# Patient Record
Sex: MALE | Race: WHITE | Employment: UNEMPLOYED | URBAN - METROPOLITAN AREA
[De-identification: names, ages, dates, MRNs, and addresses within clinical notes are randomized per-mention and may not be internally consistent; named-entity substitution may affect disease eponyms.]

---

## 2022-07-14 ENCOUNTER — OFFICE VISIT (OUTPATIENT)
Dept: INTERNAL MEDICINE | Age: 32
End: 2022-07-14
Payer: MEDICAID

## 2022-07-14 ENCOUNTER — HOSPITAL ENCOUNTER (OUTPATIENT)
Age: 32
Setting detail: SPECIMEN
Discharge: HOME | End: 2022-07-14

## 2022-07-14 VITALS
HEART RATE: 61 BPM | SYSTOLIC BLOOD PRESSURE: 132 MMHG | OXYGEN SATURATION: 99 % | TEMPERATURE: 98.4 F | HEIGHT: 68 IN | WEIGHT: 166 LBS | DIASTOLIC BLOOD PRESSURE: 76 MMHG | BODY MASS INDEX: 25.16 KG/M2

## 2022-07-14 DIAGNOSIS — F17.210 CIGARETTE SMOKER: ICD-10-CM

## 2022-07-14 DIAGNOSIS — Z11.59 NEED FOR HEPATITIS B SCREENING TEST: ICD-10-CM

## 2022-07-14 DIAGNOSIS — I10 BENIGN ESSENTIAL HYPERTENSION: Primary | ICD-10-CM

## 2022-07-14 DIAGNOSIS — Z86.19 HX OF HEPATITIS C: ICD-10-CM

## 2022-07-14 DIAGNOSIS — F90.8 ATTENTION DEFICIT HYPERACTIVITY DISORDER (ADHD), OTHER TYPE: ICD-10-CM

## 2022-07-14 DIAGNOSIS — F43.10 PTSD (POST-TRAUMATIC STRESS DISORDER): ICD-10-CM

## 2022-07-14 DIAGNOSIS — I10 BENIGN ESSENTIAL HYPERTENSION: ICD-10-CM

## 2022-07-14 DIAGNOSIS — F19.91 HISTORY OF INTRAVENOUS DRUG USE IN REMISSION: ICD-10-CM

## 2022-07-14 LAB
ABSOLUTE EOS #: <0.03 K/UL (ref 0–0.44)
ABSOLUTE IMMATURE GRANULOCYTE: <0.03 K/UL (ref 0–0.3)
ABSOLUTE LYMPH #: 2.44 K/UL (ref 1.1–3.7)
ABSOLUTE MONO #: 0.37 K/UL (ref 0.1–1.2)
ALBUMIN SERPL-MCNC: 4.8 G/DL (ref 3.5–5.2)
ALBUMIN/GLOBULIN RATIO: 1.8 (ref 1–2.5)
ALP BLD-CCNC: 55 U/L (ref 40–129)
ALT SERPL-CCNC: 48 U/L (ref 5–41)
ANION GAP SERPL CALCULATED.3IONS-SCNC: 11 MMOL/L (ref 9–17)
AST SERPL-CCNC: 30 U/L
BASOPHILS # BLD: 1 % (ref 0–2)
BASOPHILS ABSOLUTE: 0.04 K/UL (ref 0–0.2)
BILIRUB SERPL-MCNC: 0.56 MG/DL (ref 0.3–1.2)
BUN BLDV-MCNC: 12 MG/DL (ref 6–20)
CALCIUM SERPL-MCNC: 9.6 MG/DL (ref 8.6–10.4)
CHLORIDE BLD-SCNC: 101 MMOL/L (ref 98–107)
CO2: 29 MMOL/L (ref 20–31)
CREAT SERPL-MCNC: 1.01 MG/DL (ref 0.7–1.2)
EOSINOPHILS RELATIVE PERCENT: 0 % (ref 1–4)
GFR AFRICAN AMERICAN: >60 ML/MIN
GFR NON-AFRICAN AMERICAN: >60 ML/MIN
GFR SERPL CREATININE-BSD FRML MDRD: ABNORMAL ML/MIN/{1.73_M2}
GLUCOSE BLD-MCNC: 105 MG/DL (ref 70–99)
HBV SURFACE AB TITR SER: 99.77 MIU/ML
HCT VFR BLD CALC: 41.5 % (ref 40.7–50.3)
HEMOGLOBIN: 14.8 G/DL (ref 13–17)
HEPATITIS B CORE TOTAL ANTIBODY: NONREACTIVE
HEPATITIS B SURFACE ANTIGEN: NONREACTIVE
HIV AG/AB: NONREACTIVE
IMMATURE GRANULOCYTES: 0 %
LYMPHOCYTES # BLD: 43 % (ref 24–43)
MCH RBC QN AUTO: 30.6 PG (ref 25.2–33.5)
MCHC RBC AUTO-ENTMCNC: 35.7 G/DL (ref 28.4–34.8)
MCV RBC AUTO: 85.9 FL (ref 82.6–102.9)
MONOCYTES # BLD: 7 % (ref 3–12)
NRBC AUTOMATED: 0 PER 100 WBC
PDW BLD-RTO: 12.4 % (ref 11.8–14.4)
PLATELET # BLD: 213 K/UL (ref 138–453)
PMV BLD AUTO: 11.1 FL (ref 8.1–13.5)
POTASSIUM SERPL-SCNC: 3.9 MMOL/L (ref 3.7–5.3)
RBC # BLD: 4.83 M/UL (ref 4.21–5.77)
SEG NEUTROPHILS: 49 % (ref 36–65)
SEGMENTED NEUTROPHILS ABSOLUTE COUNT: 2.81 K/UL (ref 1.5–8.1)
SODIUM BLD-SCNC: 141 MMOL/L (ref 135–144)
TOTAL PROTEIN: 7.5 G/DL (ref 6.4–8.3)
WBC # BLD: 5.7 K/UL (ref 3.5–11.3)

## 2022-07-14 PROCEDURE — 99211 OFF/OP EST MAY X REQ PHY/QHP: CPT | Performed by: INTERNAL MEDICINE

## 2022-07-14 PROCEDURE — 99203 OFFICE O/P NEW LOW 30 MIN: CPT | Performed by: STUDENT IN AN ORGANIZED HEALTH CARE EDUCATION/TRAINING PROGRAM

## 2022-07-14 RX ORDER — NICOTINE 21 MG/24HR
1 PATCH, TRANSDERMAL 24 HOURS TRANSDERMAL DAILY
Qty: 42 PATCH | Refills: 0 | Status: SHIPPED | OUTPATIENT
Start: 2022-07-14 | End: 2022-08-25

## 2022-07-14 RX ORDER — NICOTINE 21 MG/24HR
PATCH, TRANSDERMAL 24 HOURS TRANSDERMAL
COMMUNITY
Start: 2022-06-21

## 2022-07-14 RX ORDER — TRAZODONE HYDROCHLORIDE 100 MG/1
100 TABLET ORAL NIGHTLY
COMMUNITY
Start: 2022-06-21

## 2022-07-14 RX ORDER — BUPRENORPHINE HYDROCHLORIDE AND NALOXONE HYDROCHLORIDE DIHYDRATE 8; 2 MG/1; MG/1
3 TABLET SUBLINGUAL DAILY
COMMUNITY

## 2022-07-14 SDOH — ECONOMIC STABILITY: FOOD INSECURITY: WITHIN THE PAST 12 MONTHS, YOU WORRIED THAT YOUR FOOD WOULD RUN OUT BEFORE YOU GOT MONEY TO BUY MORE.: NEVER TRUE

## 2022-07-14 SDOH — ECONOMIC STABILITY: FOOD INSECURITY: WITHIN THE PAST 12 MONTHS, THE FOOD YOU BOUGHT JUST DIDN'T LAST AND YOU DIDN'T HAVE MONEY TO GET MORE.: NEVER TRUE

## 2022-07-14 SDOH — ECONOMIC STABILITY: TRANSPORTATION INSECURITY
IN THE PAST 12 MONTHS, HAS LACK OF TRANSPORTATION KEPT YOU FROM MEETINGS, WORK, OR FROM GETTING THINGS NEEDED FOR DAILY LIVING?: NO

## 2022-07-14 SDOH — ECONOMIC STABILITY: TRANSPORTATION INSECURITY
IN THE PAST 12 MONTHS, HAS THE LACK OF TRANSPORTATION KEPT YOU FROM MEDICAL APPOINTMENTS OR FROM GETTING MEDICATIONS?: NO

## 2022-07-14 ASSESSMENT — ENCOUNTER SYMPTOMS
RHINORRHEA: 0
CONSTIPATION: 0
SORE THROAT: 0
DIARRHEA: 0
SHORTNESS OF BREATH: 0
WHEEZING: 0
VOMITING: 0
COUGH: 0
ABDOMINAL PAIN: 0

## 2022-07-14 ASSESSMENT — PATIENT HEALTH QUESTIONNAIRE - PHQ9
SUM OF ALL RESPONSES TO PHQ QUESTIONS 1-9: 2
2. FEELING DOWN, DEPRESSED OR HOPELESS: 1
SUM OF ALL RESPONSES TO PHQ9 QUESTIONS 1 & 2: 2
SUM OF ALL RESPONSES TO PHQ QUESTIONS 1-9: 2
1. LITTLE INTEREST OR PLEASURE IN DOING THINGS: 1
SUM OF ALL RESPONSES TO PHQ QUESTIONS 1-9: 2
SUM OF ALL RESPONSES TO PHQ QUESTIONS 1-9: 2

## 2022-07-14 ASSESSMENT — SOCIAL DETERMINANTS OF HEALTH (SDOH): HOW HARD IS IT FOR YOU TO PAY FOR THE VERY BASICS LIKE FOOD, HOUSING, MEDICAL CARE, AND HEATING?: NOT HARD AT ALL

## 2022-07-14 NOTE — PATIENT INSTRUCTIONS
Return To Clinic in October for 3 month follow up. Patient was added to wait list. Patient will be contacted by office to schedule upcoming appointment with the physician. After Visit Summary given and reviewed. The medication list included in this document is our record of what you are currently taking, including any changes that were made at today's visit. If you find any differences when compared to your medications at home, or have any questions that were not answered at your visit, please contact the office. It is very important for your care that you keep your appointment. If for some reason you are unable to keep your appointment, it is equally important that you call our office at [phone] to cancel your appointment and reschedule. Failure to do so may result in your termination from our practice.      -SHANEKA iBrd
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
## 2022-07-14 NOTE — PROGRESS NOTES
Attending Physician Statement  I have discussed the care of Zara Mendoza including pertinent history and exam findings,  with the resident. I have reviewed the key elements of all parts of the encounter with the resident. I agree with the assessment, plan and orders as documented by the resident.   (GE Modifier)    MD PRISCA Bhakta  Attending Physician, [address] [place] [address], Internal Medicine Residency Program  [address] [place] [address]  7/14/2022, 1:57 PM

## 2022-07-17 LAB
HEPATITIS C RNA PCR QUANT: DETECTED
HEPATITIS C RNA QUANT LOG: 6.66 LOG IU/ML
HEPATITIS C RNA-PCR: ABNORMAL IU/ML
SOURCE: ABNORMAL

## 2024-12-13 ENCOUNTER — HOSPITAL ENCOUNTER (OUTPATIENT)
Age: 34
Discharge: HOME | End: 2024-12-13
Payer: COMMERCIAL

## 2024-12-13 LAB
ALANINE AMINOTRANSFERASE, FIBROMETER: NORMAL
ALBUMIN SERPL-MCNC: 4.4 G/DL (ref 3.5–5.2)
ALBUMIN/GLOB SERPL: 1.6 {RATIO} (ref 1–2.5)
ALP SERPL-CCNC: 52 U/L (ref 40–129)
ALPHA-2-MACROGLOBULIN, FIBROMETER: NORMAL
ALT SERPL-CCNC: 55 U/L (ref 10–50)
ANION GAP SERPL CALCULATED.3IONS-SCNC: 8 MMOL/L (ref 9–16)
ASPARTATE AMINOTRANSFERASE, FIBROMETER: NORMAL
AST SERPL-CCNC: 42 U/L (ref 10–50)
BASOPHILS # BLD: <0.03 K/UL (ref 0–0.2)
BASOPHILS NFR BLD: 0 % (ref 0–2)
BILIRUB SERPL-MCNC: 0.5 MG/DL (ref 0–1.2)
BUN SERPL-MCNC: 13 MG/DL (ref 6–20)
CALCIUM SERPL-MCNC: 9.2 MG/DL (ref 8.6–10.4)
CHLORIDE SERPL-SCNC: 103 MMOL/L (ref 98–107)
CHOLEST SERPL-MCNC: 90 MG/DL (ref 0–199)
CHOLESTEROL/HDL RATIO: 2.6
CIRRHOMETER PATIENT SCORE: NORMAL
CO2 SERPL-SCNC: 30 MMOL/L (ref 20–31)
CREAT SERPL-MCNC: 0.9 MG/DL (ref 0.7–1.2)
EER FIBROMETER REPORT: NORMAL
EOSINOPHIL # BLD: 0.08 K/UL (ref 0–0.44)
EOSINOPHILS RELATIVE PERCENT: 1 % (ref 1–4)
ERYTHROCYTE [DISTWIDTH] IN BLOOD BY AUTOMATED COUNT: 12.9 % (ref 11.8–14.4)
EST. AVERAGE GLUCOSE BLD GHB EST-MCNC: 88 MG/DL
FIBROMETER INTERPRETATION: NORMAL
FIBROMETER PATIENT SCORE: NORMAL
FIBROMETER PLATELET COUNT: 210
FIBROMETER PROTHROMBIN INDEX: NORMAL
FIBROSIS METAVIR CLASSIFICATION: NORMAL
GAMMA GLUTAMYL TRANSFERASE, FIBROMETER: NORMAL
GFR, ESTIMATED: >90 ML/MIN/1.73M2
GLUCOSE SERPL-MCNC: 116 MG/DL (ref 74–99)
HBA1C MFR BLD: 4.7 % (ref 4–6)
HCT VFR BLD AUTO: 41.1 % (ref 40.7–50.3)
HCV GENTYP SERPL NAA+PROBE: NORMAL
HDLC SERPL-MCNC: 35 MG/DL
HGB BLD-MCNC: 13.5 G/DL (ref 13–17)
HIV 1+2 AB+HIV1 P24 AG SERPL QL IA: NONREACTIVE
IMM GRANULOCYTES # BLD AUTO: <0.03 K/UL (ref 0–0.3)
IMM GRANULOCYTES NFR BLD: 0 %
INFLAMETER METAVIR CLASSIFICATION: NORMAL
INFLAMETER PATIENT SCORE: NORMAL
LDLC SERPL CALC-MCNC: 21 MG/DL (ref 0–100)
LYMPHOCYTES NFR BLD: 2.66 K/UL (ref 1.1–3.7)
LYMPHOCYTES RELATIVE PERCENT: 41 % (ref 24–43)
MCH RBC QN AUTO: 28.8 PG (ref 25.2–33.5)
MCHC RBC AUTO-ENTMCNC: 32.8 G/DL (ref 28.4–34.8)
MCV RBC AUTO: 87.6 FL (ref 82.6–102.9)
MONOCYTES NFR BLD: 0.57 K/UL (ref 0.1–1.2)
MONOCYTES NFR BLD: 9 % (ref 3–12)
NEUTROPHILS NFR BLD: 49 % (ref 36–65)
NEUTS SEG NFR BLD: 3.12 K/UL (ref 1.5–8.1)
NRBC BLD-RTO: 0 PER 100 WBC
PLATELET # BLD AUTO: 200 K/UL (ref 138–453)
PMV BLD AUTO: 10.5 FL (ref 8.1–13.5)
POTASSIUM SERPL-SCNC: 4.3 MMOL/L (ref 3.7–5.3)
PROT SERPL-MCNC: 7.2 G/DL (ref 6.6–8.7)
RBC # BLD AUTO: 4.69 M/UL (ref 4.21–5.77)
SODIUM SERPL-SCNC: 141 MMOL/L (ref 136–145)
TRIGL SERPL-MCNC: 170 MG/DL
TSH SERPL DL<=0.05 MIU/L-ACNC: 1.09 UIU/ML (ref 0.27–4.2)
UREA NITROGEN, FIBROMETER: NORMAL
VLDLC SERPL CALC-MCNC: 34 MG/DL (ref 1–30)
WBC OTHER # BLD: 6.5 K/UL (ref 3.5–11.3)

## 2024-12-13 PROCEDURE — 80061 LIPID PANEL: CPT

## 2024-12-13 PROCEDURE — 84443 ASSAY THYROID STIM HORMONE: CPT

## 2024-12-13 PROCEDURE — 87522 HEPATITIS C REVRS TRNSCRPJ: CPT

## 2024-12-13 PROCEDURE — 83883 ASSAY NEPHELOMETRY NOT SPEC: CPT

## 2024-12-13 PROCEDURE — 82977 ASSAY OF GGT: CPT

## 2024-12-13 PROCEDURE — 84460 ALANINE AMINO (ALT) (SGPT): CPT

## 2024-12-13 PROCEDURE — 84450 TRANSFERASE (AST) (SGOT): CPT

## 2024-12-13 PROCEDURE — 36415 COLL VENOUS BLD VENIPUNCTURE: CPT

## 2024-12-13 PROCEDURE — 87902 NFCT AGT GNTYP ALYS HEP C: CPT

## 2024-12-13 PROCEDURE — 80053 COMPREHEN METABOLIC PANEL: CPT

## 2024-12-13 PROCEDURE — 87389 HIV-1 AG W/HIV-1&-2 AB AG IA: CPT

## 2024-12-13 PROCEDURE — 84520 ASSAY OF UREA NITROGEN: CPT

## 2024-12-13 PROCEDURE — 83036 HEMOGLOBIN GLYCOSYLATED A1C: CPT

## 2024-12-13 PROCEDURE — 85025 COMPLETE CBC W/AUTO DIFF WBC: CPT

## 2024-12-16 LAB
HCV RNA # SERPL NAA+PROBE: DETECTED {COPIES}/ML
HCV RNA SERPL NAA+PROBE-ACNC: ABNORMAL IU/ML
HCV RNA SERPL NAA+PROBE-LOG IU: 6.73 LOG IU/ML
SPECIMEN SOURCE: ABNORMAL

## 2024-12-18 LAB — HCV GENTYP SERPL NAA+PROBE: NORMAL

## 2024-12-20 LAB
ALANINE AMINOTRANSFERASE, FIBROMETER: 60 U/L (ref 5–50)
ALPHA-2-MACROGLOBULIN, FIBROMETER: 196 MG/DL (ref 131–293)
ASPARTATE AMINOTRANSFERASE, FIBROMETER: 38 U/L (ref 9–50)
CIRRHOMETER PATIENT SCORE: 0.01
EER FIBROMETER REPORT: ABNORMAL
FIBROMETER INTERPRETATION: ABNORMAL
FIBROMETER PATIENT SCORE: 0.33
FIBROMETER PLATELET COUNT: 210
FIBROMETER PROTHROMBIN INDEX: 128 % (ref 90–120)
FIBROSIS METAVIR CLASSIFICATION: ABNORMAL
GAMMA GLUTAMYL TRANSFERASE, FIBROMETER: 84 U/L (ref 7–51)
INFLAMETER METAVIR CLASSIFICATION: ABNORMAL
INFLAMETER PATIENT SCORE: 0.2
UREA NITROGEN, FIBROMETER: 13 MG/DL (ref 7–20)